# Patient Record
Sex: MALE | URBAN - METROPOLITAN AREA
[De-identification: names, ages, dates, MRNs, and addresses within clinical notes are randomized per-mention and may not be internally consistent; named-entity substitution may affect disease eponyms.]

---

## 2018-10-18 ENCOUNTER — IMPORTED ENCOUNTER (OUTPATIENT)
Dept: URBAN - METROPOLITAN AREA CLINIC 9 | Facility: CLINIC | Age: 63
End: 2018-10-18

## 2018-11-15 ENCOUNTER — IMPORTED ENCOUNTER (OUTPATIENT)
Dept: URBAN - METROPOLITAN AREA CLINIC 9 | Facility: CLINIC | Age: 63
End: 2018-11-15

## 2018-11-15 PROBLEM — E11.9: Noted: 2018-11-15

## 2019-08-08 NOTE — PATIENT DISCUSSION
(S05. 12XA) Contusion of eyeball and orbital tissues, left eye, init - Assesment : TRAUMA 1953 - Plan : OBSERVE

## 2019-08-08 NOTE — PATIENT DISCUSSION
(H25.12) Age-related nuclear cataract, left eye - Assesment : Examination revealed cataract. ASTIGMATISM PRESENT. CONSIDER CATARACT SURGERY IN THE NEAR FUTURE - Plan : Monitor for changes. Advised patient to call our office with decreased vision or increased symptoms.

## 2019-08-08 NOTE — PATIENT DISCUSSION
(Z96.1) Presence of intraocular lens - Assesment : Examination revealed patient is Pseudophakic SX WAS MARCH 25, 2019 AND A YAG FOLLOWING. PATIENT REPORTS BLURRY VISION. LENS IN GOOD POSITION. PATIENT CLOSES RIGHT EYE WHEN READING. CONSIDER A CONTACT LENS TRIAL IN OFFICE - Plan : Monitor for changes. Advised patient to call our office with decreased vision or increased symptoms. CONSIDER A CONTACT LENS TRIAL IN OFFICE.   REFRACTION WITH TRIAL FRAMES WITH JUNIOR

## 2019-08-08 NOTE — PATIENT DISCUSSION
(H35.363) Drusen (degenerative) of macula, bilateral - Assesment : Examination revealed Drusen. - Plan : Monitor for changes. Advised patient to call our office with decreased vision or increased symptoms.   1-2 WEEKS REFRACTION WITH TRIAL FRAMES WITH HILDA PACHECO TO SEE LORENZO AFTER

## 2019-08-08 NOTE — PATIENT DISCUSSION
(D10.183) Dermatochalasis of right upper eyelid - Assesment : Examination revealed Dermatochalasis - Plan : Monitor for changes. Advised patient to call our office with decreased vision or increased symptoms.

## 2019-08-08 NOTE — PATIENT DISCUSSION
(Z20.062) Vitreous degeneration, bilateral - Assesment : Examination revealed PVD - Plan : Monitor for changes. Advised patient to call our office with decreased vision or an increase in flashes and/or floaters.

## 2019-08-08 NOTE — PATIENT DISCUSSION
(N86.061) Dermatochalasis of left upper eyelid - Assesment : Examination revealed Dermatochalasis - Plan : Monitor for changes. Advised patient to call our office with decreased vision or increased symptoms.

## 2019-08-08 NOTE — PATIENT DISCUSSION
(H53.2) Diplopia - Assesment : Examination revealed diplopia. H/O SINCE TRAUMA.  PATIENT CURRENTLY HAS PRISM IN HIS GLASSES - Plan : OBSERVE

## 2021-10-18 ASSESSMENT — KERATOMETRY
OD_AXISANGLE2_DEGREES: 164
OD_K2POWER_DIOPTERS: 42
OS_K1POWER_DIOPTERS: 40.875
OS_AXISANGLE_DEGREES: 82
OD_K1POWER_DIOPTERS: 41.375
OS_AXISANGLE2_DEGREES: 172
OS_K2POWER_DIOPTERS: 42
OD_AXISANGLE_DEGREES: 74

## 2021-10-18 ASSESSMENT — VISUAL ACUITY
OD_SC: 20/25 - SN
OS_SC: 20/30 SN
OS_SC: 20/30 SN
OD_SC: 20/25 -2 SN

## 2021-10-18 ASSESSMENT — TONOMETRY
OD_IOP_MMHG: 14
OS_IOP_MMHG: 15

## 2025-03-04 ENCOUNTER — EMERGENCY VISIT (OUTPATIENT)
Age: 70
End: 2025-03-04

## 2025-03-04 DIAGNOSIS — E11.9: ICD-10-CM

## 2025-03-04 DIAGNOSIS — H43.813: ICD-10-CM

## 2025-03-04 PROCEDURE — 99204 OFFICE O/P NEW MOD 45 MIN: CPT

## 2025-03-04 PROCEDURE — 92134 CPTRZ OPH DX IMG PST SGM RTA: CPT

## 2025-08-07 ENCOUNTER — COMPREHENSIVE EXAM (OUTPATIENT)
Age: 70
End: 2025-08-07

## 2025-08-07 DIAGNOSIS — H25.13: ICD-10-CM

## 2025-08-07 DIAGNOSIS — H43.813: ICD-10-CM

## 2025-08-07 DIAGNOSIS — H04.123: ICD-10-CM

## 2025-08-07 DIAGNOSIS — H52.4: ICD-10-CM

## 2025-08-07 DIAGNOSIS — E11.9: ICD-10-CM

## 2025-08-07 PROCEDURE — 92134 CPTRZ OPH DX IMG PST SGM RTA: CPT

## 2025-08-07 PROCEDURE — 92015 DETERMINE REFRACTIVE STATE: CPT

## 2025-08-07 PROCEDURE — 92014 COMPRE OPH EXAM EST PT 1/>: CPT
